# Patient Record
Sex: FEMALE | URBAN - METROPOLITAN AREA
[De-identification: names, ages, dates, MRNs, and addresses within clinical notes are randomized per-mention and may not be internally consistent; named-entity substitution may affect disease eponyms.]

---

## 2023-08-22 PROBLEM — Z00.00 ENCOUNTER FOR PREVENTIVE HEALTH EXAMINATION: Status: ACTIVE | Noted: 2023-08-22

## 2023-08-23 ENCOUNTER — EMERGENCY (EMERGENCY)
Facility: HOSPITAL | Age: 33
LOS: 1 days | Discharge: STILL ADMITTED OR ADMINISTRATIVE | End: 2023-08-23
Admitting: EMERGENCY MEDICINE
Payer: SELF-PAY

## 2023-08-23 ENCOUNTER — OUTPATIENT (OUTPATIENT)
Dept: OUTPATIENT SERVICES | Facility: HOSPITAL | Age: 33
LOS: 1 days | Discharge: HOME | End: 2023-08-23
Payer: SELF-PAY

## 2023-08-23 VITALS
DIASTOLIC BLOOD PRESSURE: 92 MMHG | RESPIRATION RATE: 18 BRPM | OXYGEN SATURATION: 100 % | HEART RATE: 80 BPM | TEMPERATURE: 98 F | SYSTOLIC BLOOD PRESSURE: 140 MMHG

## 2023-08-23 DIAGNOSIS — O26.899 OTHER SPECIFIED PREGNANCY RELATED CONDITIONS, UNSPECIFIED TRIMESTER: ICD-10-CM

## 2023-08-23 PROCEDURE — 83986 ASSAY PH BODY FLUID NOS: CPT | Mod: QW

## 2023-08-23 PROCEDURE — 99221 1ST HOSP IP/OBS SF/LOW 40: CPT | Mod: 25

## 2023-08-23 PROCEDURE — 59025 FETAL NON-STRESS TEST: CPT | Mod: 26

## 2023-08-23 PROCEDURE — L9996: CPT

## 2023-08-23 NOTE — ED ADULT TRIAGE NOTE - CHIEF COMPLAINT QUOTE
Pt reporting to the ED for lower abdominal pressure. Pt is 32 weeks pregnant, due date Oct 17. Pt is , no vaginal bleeding. Report given to L&D triage NP, to be brought to L&D.

## 2023-08-24 VITALS — DIASTOLIC BLOOD PRESSURE: 59 MMHG | HEART RATE: 75 BPM | SYSTOLIC BLOOD PRESSURE: 102 MMHG

## 2023-08-24 VITALS — HEART RATE: 90 BPM | SYSTOLIC BLOOD PRESSURE: 122 MMHG | DIASTOLIC BLOOD PRESSURE: 72 MMHG

## 2023-08-24 DIAGNOSIS — Z3A.32 32 WEEKS GESTATION OF PREGNANCY: ICD-10-CM

## 2023-08-24 DIAGNOSIS — R10.2 PELVIC AND PERINEAL PAIN: ICD-10-CM

## 2023-08-24 DIAGNOSIS — O26.893 OTHER SPECIFIED PREGNANCY RELATED CONDITIONS, THIRD TRIMESTER: ICD-10-CM

## 2023-08-24 DIAGNOSIS — Z03.71 ENCOUNTER FOR SUSPECTED PROBLEM WITH AMNIOTIC CAVITY AND MEMBRANE RULED OUT: ICD-10-CM

## 2023-08-24 LAB
APPEARANCE UR: CLEAR — SIGNIFICANT CHANGE UP
APPEARANCE UR: CLEAR — SIGNIFICANT CHANGE UP
BACTERIA # UR AUTO: NEGATIVE /HPF — SIGNIFICANT CHANGE UP
BACTERIA # UR AUTO: NEGATIVE /HPF — SIGNIFICANT CHANGE UP
BILIRUB UR-MCNC: NEGATIVE — SIGNIFICANT CHANGE UP
BILIRUB UR-MCNC: NEGATIVE — SIGNIFICANT CHANGE UP
CAST: 0 /LPF — SIGNIFICANT CHANGE UP (ref 0–4)
CAST: 0 /LPF — SIGNIFICANT CHANGE UP (ref 0–4)
COLOR SPEC: YELLOW — SIGNIFICANT CHANGE UP
COLOR SPEC: YELLOW — SIGNIFICANT CHANGE UP
DIFF PNL FLD: NEGATIVE — SIGNIFICANT CHANGE UP
DIFF PNL FLD: NEGATIVE — SIGNIFICANT CHANGE UP
GLUCOSE UR QL: NEGATIVE MG/DL — SIGNIFICANT CHANGE UP
GLUCOSE UR QL: NEGATIVE MG/DL — SIGNIFICANT CHANGE UP
KETONES UR-MCNC: NEGATIVE MG/DL — SIGNIFICANT CHANGE UP
KETONES UR-MCNC: NEGATIVE MG/DL — SIGNIFICANT CHANGE UP
LEUKOCYTE ESTERASE UR-ACNC: NEGATIVE — SIGNIFICANT CHANGE UP
LEUKOCYTE ESTERASE UR-ACNC: NEGATIVE — SIGNIFICANT CHANGE UP
NITRITE UR-MCNC: NEGATIVE — SIGNIFICANT CHANGE UP
NITRITE UR-MCNC: NEGATIVE — SIGNIFICANT CHANGE UP
PH UR: 5.5 — SIGNIFICANT CHANGE UP (ref 5–8)
PH UR: 5.5 — SIGNIFICANT CHANGE UP (ref 5–8)
PROT UR-MCNC: NEGATIVE MG/DL — SIGNIFICANT CHANGE UP
PROT UR-MCNC: NEGATIVE MG/DL — SIGNIFICANT CHANGE UP
RBC CASTS # UR COMP ASSIST: 1 /HPF — SIGNIFICANT CHANGE UP (ref 0–4)
RBC CASTS # UR COMP ASSIST: 1 /HPF — SIGNIFICANT CHANGE UP (ref 0–4)
SP GR SPEC: 1.02 — SIGNIFICANT CHANGE UP (ref 1–1.03)
SP GR SPEC: 1.02 — SIGNIFICANT CHANGE UP (ref 1–1.03)
SQUAMOUS # UR AUTO: 1 /HPF — SIGNIFICANT CHANGE UP (ref 0–5)
SQUAMOUS # UR AUTO: 1 /HPF — SIGNIFICANT CHANGE UP (ref 0–5)
UROBILINOGEN FLD QL: 0.2 MG/DL — SIGNIFICANT CHANGE UP (ref 0.2–1)
UROBILINOGEN FLD QL: 0.2 MG/DL — SIGNIFICANT CHANGE UP (ref 0.2–1)
WBC UR QL: 1 /HPF — SIGNIFICANT CHANGE UP (ref 0–5)
WBC UR QL: 1 /HPF — SIGNIFICANT CHANGE UP (ref 0–5)

## 2023-08-24 NOTE — OB RN TRIAGE NOTE - FALL HARM RISK - CONCLUSION
Ling comes in for evaluation of symptoms of dysuria and frequency which started yesterday. No fever, chills or back pain and no hematuria. She's had one other urinary tract infection in the past and states this feels the same.    I have reviewed the patient's medications and allergies, past medical, surgical, social and family history, updating these as appropriate. See Histories section of the EMR for a display of this information.     Dr. Carvajal supervising.    The remainder of the review of systems is negative except as outlined in the HPI.        EXAM: Patient is alert, oriented x 3, in no acute distress.  Skin color and turgor are normal.  Vital signs reviewed.    Vitals:    09/03/17 0956   BP: 142/75   Pulse: 76   Temp: 97.8 °F (36.6 °C)   SpO2: 99%   Weight: 56.7 kg   Height: 5' 7\" (1.702 m)           Lungs are clear auscultation bilaterally. Heart shows regular rate and rhythm without murmurs or rubs.    Abdomen is soft with mild suprapubic tenderness. No flank tenderness, masses or hepatosplenomegaly.    Back without CVA tenderness.    Urinalysis shows large blood with moderate LE T. Microscopic exam shows 6-10 red and white blood cells and few bacteria. Culture pending.    Assessment and plan: Urinary tract infection. We'll treat with Macrobid to be taken as directed. Push fluids and follow-up with her family physician if worsening.  
Pt here c/o dysuria and urinary freq onset yesterday  
Universal Safety Interventions

## 2023-08-24 NOTE — OB RN TRIAGE NOTE - CHIEF COMPLAINT QUOTE
Pelvic pain and fluid leakage [id number] , clear. Pelvic pain and fluid leakage [id number] , clear. Pelvic pain and fluid leakage [id number] , clear.

## 2023-08-24 NOTE — OB PROVIDER TRIAGE NOTE - NSHPLABSRESULTS_GEN_ALL_CORE
Urinalysis Basic - ( 24 Aug 2023 02:00 )    Color: Yellow / Appearance: Clear / S.017 / pH: x  Gluc: x / Ketone: Negative mg/dL  / Bili: Negative / Urobili: 0.2 mg/dL   Blood: x / Protein: Negative mg/dL / Nitrite: Negative   Leuk Esterase: Negative / RBC: 1 /HPF / WBC 1 /HPF   Sq Epi: x / Non Sq Epi: 1 /HPF / Bacteria: Negative /HPF

## 2023-08-24 NOTE — OB PROVIDER TRIAGE NOTE - PLAN OF CARE
- Please return for decreased/no fetal movement, vaginal bleeding similar to that of a period, leaking/gush of fluid, regular contractions occurring 4-5 minutes for one hour or requiring pain medication

## 2023-08-24 NOTE — OB PROVIDER TRIAGE NOTE - NSOBPROVIDERNOTE_OBGYN_ALL_OB_FT
- Use Colace as directed for constipation  - Use Preparation H for hemorrhoids 32y  @32.2w female ruled out for  labor and PROM, discharged to home.    - Patient to be discharged home with follow up and return precautions  - Clinic will be contacting you for appointment  - Please return for decreased/no fetal movement, vaginal bleeding similar to that of a period, leaking/gush of fluid, regular contractions occurring 4-5 minutes for one hour or requiring pain medication   - Patient and partner and educated of plan and demonstrate understanding. All questions answered. Discharge instructions provided and signed.   - Discharged at 0245  - Discussed with Dr. Donahue    - Use Colace 1 tab at night with full glass of water for constipation  - Use Preparation H for hemorrhoids

## 2023-08-24 NOTE — OB RN TRIAGE NOTE - FALL HARM RISK - UNIVERSAL INTERVENTIONS
Bed in lowest position, wheels locked, appropriate side rails in place/Call bell, personal items and telephone in reach/Instruct patient to call for assistance before getting out of bed or chair/Non-slip footwear when patient is out of bed/[place] to call system/Physically safe environment - no spills, clutter or unnecessary equipment/Purposeful Proactive Rounding/Room/bathroom lighting operational, light cord in reach Bed in lowest position, wheels locked, appropriate side rails in place/Call bell, personal items and telephone in reach/Instruct patient to call for assistance before getting out of bed or chair/Non-slip footwear when patient is out of bed/[place] to call system/Physically safe environment - no spills, clutter or unnecessary equipment/Purposeful Proactive Rounding/Room/bathroom lighting operational, light cord in reach Bed in lowest position, wheels locked, appropriate side rails in place/Call bell, personal items and telephone in reach/Instruct patient to call for assistance before getting out of bed or chair/Non-slip footwear when patient is out of bed/[place] to call system/Physically safe environment - no spills, clutter or unnecessary equipment/Purposeful Proactive Rounding/Room/bathroom lighting operational, light cord in reach

## 2023-08-24 NOTE — OB PROVIDER TRIAGE NOTE - NS_GESTAGE_OBGYN_ALL_OB_FT
01/2023 TTE: Poor quality, multiple areas of akinesis and hypokinesis with EF 35-40%. Note pitting edema and warmth in L ankle  -Cont. lasix 40mg daily for now  -Cont. Coreg BID  -Cont. Lisinopril daily  -BNP 32w2d

## 2023-08-24 NOTE — OB PROVIDER TRIAGE NOTE - HISTORY OF PRESENT ILLNESS
32y , EGA@ 32.2 weeks, presents for pelvic pain and leakage of fluid. Pt reports pelvic pain when sitting up from bed or sitting up from being on her side. Pt reports is not constant and is only on specific movements. Endorses 1 episode of damp underwear with clear fluid, pt thinks it may have been urine at 1300. Patient denies contractions, reports  + fetal movements, denies vaginal bleeding. Pt denies any other concerns.  Antepartum course is significant for:  - Pt received PNC in [place] up until she travelled to [place] 1 month ago

## 2023-08-24 NOTE — OB PROVIDER TRIAGE NOTE - NSHPPHYSICALEXAM_GEN_ALL_CORE
T(C): 37.1 (08-24-23 @ 00:50), Max: 37.1 (08-24-23 @ 00:50)  HR: 90 (08-24-23 @ 00:50) (80 - 90)  BP: 122/72 (08-24-23 @ 00:50) (122/72 - 140/92)  RR: 16 (08-24-23 @ 00:50) (16 - 18)  SpO2: 100% (08-23-23 @ 23:00) (100% - 100%)  – PE:   CV: RRR  Pulm: breathing comfortably on RA  Abd: soft, gravid, nontender  Extr: moving all extremities with ease  TAUS: images saved in ASOB, vertex position, anterior placenta, M mode:  FHR, BARB: 6, BPP 8/8  NST: baseline 140 FHR, moderate variability, + accels, - decels, reactive  SSE: cervix appears closed, no leakage of fluid, no VB, + leukorrhea

## 2023-08-24 NOTE — OB PROVIDER TRIAGE NOTE - ADDITIONAL INSTRUCTIONS
- Use Colace 1 tab at night with full glass of water for constipation  - Use Preparation H for hemorrhoids  - Please return for decreased/no fetal movement, vaginal bleeding similar to that of a period, leaking/gush of fluid, regular contractions occurring 4-5 minutes for one hour or requiring pain medication

## 2023-08-28 PROBLEM — Z00.00 ENCOUNTER FOR PREVENTIVE HEALTH EXAMINATION: Status: ACTIVE | Noted: 2023-08-28

## 2023-08-29 ENCOUNTER — OUTPATIENT (OUTPATIENT)
Dept: INPATIENT UNIT | Facility: HOSPITAL | Age: 33
LOS: 1 days | Discharge: HOME | End: 2023-08-29

## 2023-08-29 ENCOUNTER — EMERGENCY (EMERGENCY)
Facility: HOSPITAL | Age: 33
LOS: 1 days | Discharge: STILL ADMITTED OR ADMINISTRATIVE | End: 2023-08-29
Admitting: EMERGENCY MEDICINE
Payer: SELF-PAY

## 2023-08-29 ENCOUNTER — APPOINTMENT (OUTPATIENT)
Dept: ANTEPARTUM | Facility: CLINIC | Age: 33
End: 2023-08-29

## 2023-08-29 VITALS
DIASTOLIC BLOOD PRESSURE: 70 MMHG | SYSTOLIC BLOOD PRESSURE: 118 MMHG | TEMPERATURE: 98 F | HEART RATE: 88 BPM | RESPIRATION RATE: 16 BRPM

## 2023-08-29 VITALS
DIASTOLIC BLOOD PRESSURE: 69 MMHG | HEART RATE: 87 BPM | SYSTOLIC BLOOD PRESSURE: 113 MMHG | TEMPERATURE: 98 F | RESPIRATION RATE: 18 BRPM | OXYGEN SATURATION: 98 %

## 2023-08-29 VITALS — DIASTOLIC BLOOD PRESSURE: 55 MMHG | SYSTOLIC BLOOD PRESSURE: 116 MMHG | HEART RATE: 91 BPM

## 2023-08-29 DIAGNOSIS — O26.899 OTHER SPECIFIED PREGNANCY RELATED CONDITIONS, UNSPECIFIED TRIMESTER: ICD-10-CM

## 2023-08-29 LAB
APPEARANCE UR: CLEAR — SIGNIFICANT CHANGE UP
APPEARANCE UR: CLEAR — SIGNIFICANT CHANGE UP
BACTERIA # UR AUTO: ABNORMAL /HPF
BACTERIA # UR AUTO: ABNORMAL /HPF
BILIRUB UR-MCNC: NEGATIVE — SIGNIFICANT CHANGE UP
BILIRUB UR-MCNC: NEGATIVE — SIGNIFICANT CHANGE UP
CAST: 0 /LPF — SIGNIFICANT CHANGE UP (ref 0–4)
CAST: 0 /LPF — SIGNIFICANT CHANGE UP (ref 0–4)
COLOR SPEC: YELLOW — SIGNIFICANT CHANGE UP
COLOR SPEC: YELLOW — SIGNIFICANT CHANGE UP
DIFF PNL FLD: NEGATIVE — SIGNIFICANT CHANGE UP
DIFF PNL FLD: NEGATIVE — SIGNIFICANT CHANGE UP
GLUCOSE UR QL: NEGATIVE MG/DL — SIGNIFICANT CHANGE UP
GLUCOSE UR QL: NEGATIVE MG/DL — SIGNIFICANT CHANGE UP
KETONES UR-MCNC: NEGATIVE MG/DL — SIGNIFICANT CHANGE UP
KETONES UR-MCNC: NEGATIVE MG/DL — SIGNIFICANT CHANGE UP
LEUKOCYTE ESTERASE UR-ACNC: ABNORMAL
LEUKOCYTE ESTERASE UR-ACNC: ABNORMAL
NITRITE UR-MCNC: NEGATIVE — SIGNIFICANT CHANGE UP
NITRITE UR-MCNC: NEGATIVE — SIGNIFICANT CHANGE UP
PH UR: 6 — SIGNIFICANT CHANGE UP (ref 5–8)
PH UR: 6 — SIGNIFICANT CHANGE UP (ref 5–8)
PROT UR-MCNC: NEGATIVE MG/DL — SIGNIFICANT CHANGE UP
PROT UR-MCNC: NEGATIVE MG/DL — SIGNIFICANT CHANGE UP
RBC CASTS # UR COMP ASSIST: 1 /HPF — SIGNIFICANT CHANGE UP (ref 0–4)
RBC CASTS # UR COMP ASSIST: 1 /HPF — SIGNIFICANT CHANGE UP (ref 0–4)
SP GR SPEC: 1.01 — SIGNIFICANT CHANGE UP (ref 1–1.03)
SP GR SPEC: 1.01 — SIGNIFICANT CHANGE UP (ref 1–1.03)
SQUAMOUS # UR AUTO: 8 /HPF — HIGH (ref 0–5)
SQUAMOUS # UR AUTO: 8 /HPF — HIGH (ref 0–5)
UROBILINOGEN FLD QL: 0.2 MG/DL — SIGNIFICANT CHANGE UP (ref 0.2–1)
UROBILINOGEN FLD QL: 0.2 MG/DL — SIGNIFICANT CHANGE UP (ref 0.2–1)
WBC UR QL: 2 /HPF — SIGNIFICANT CHANGE UP (ref 0–5)
WBC UR QL: 2 /HPF — SIGNIFICANT CHANGE UP (ref 0–5)

## 2023-08-29 PROCEDURE — L9996: CPT

## 2023-08-29 NOTE — OB RN TRIAGE NOTE - FALL HARM RISK - UNIVERSAL INTERVENTIONS
Bed in lowest position, wheels locked, appropriate side rails in place/Call bell, personal items and telephone in reach/Instruct patient to call for assistance before getting out of bed or chair/Non-slip footwear when patient is out of bed/[place] to call system/Physically safe environment - no spills, clutter or unnecessary equipment/Purposeful Proactive Rounding/Room/bathroom lighting operational, light cord in reach Bed in lowest position, wheels locked, appropriate side rails in place/Call bell, personal items and telephone in reach/Instruct patient to call for assistance before getting out of bed or chair/Non-slip footwear when patient is out of bed/[place] to call system/Physically safe environment - no spills, clutter or unnecessary equipment/Purposeful Proactive Rounding/Room/bathroom lighting operational, light cord in reach Bed in lowest position, wheels locked, appropriate side rails in place/Call bell, personal items and telephone in reach/Instruct patient to call for assistance before getting out of bed or chair/Non-slip footwear when patient is out of bed/[place] to call system/Physically safe environment - no spills, clutter or unnecessary equipment/Purposeful Proactive Rounding/Room/bathroom lighting operational, light cord in reach

## 2023-08-29 NOTE — OB PROVIDER TRIAGE NOTE - NSPROVTRIAGESELHIDDEN_OBGYN_ALL_OB_FT
[NS_AttendInformed_OBGYN_ALL_OB:[id number]==] [NS_AttendInformed_OBGYN_ALL_OB:[id number]] [NS_AttendInformed_OBGYN_ALL_OB:[id number]==]

## 2023-08-29 NOTE — OB PROVIDER TRIAGE NOTE - HISTORY OF PRESENT ILLNESS
33yo  at 33+0 presenting from the ED for abdominal pain, came from [place] 1 month ago and has not yet had ob care in the [place] Pt. denies decreased fetal movement, visualization of blood, fluid, fever, cough, chills, sob, palpitations, n/v.  31yo  at 33+0 presenting from the ED for abdominal pain, came from [place] 1 month ago and has not yet had ob care in the [place] Pt. denies decreased fetal movement, visualization of blood, fluid, fever, cough, chills, sob, palpitations, n/v.

## 2023-08-29 NOTE — OB PROVIDER TRIAGE NOTE - NSOBPROVIDERNOTE_OBGYN_ALL_OB_FT
31yo  at 33+0 presenting from the ED for abdominal pain, came from [place] 1 month ago and has not yet had ob care in the [place]   Pt. placed on continuous external TOCO  NST- reactive, moderate variability, accelerations present  TAUS: vertex, cephalic Placenta: posterior  BPP:8/8 BARB:9.63  Physical: No tenderness on light or deep palpation, discomfort felt where baby's lower extremities are.  Pain 0/10 Contractions 0/10  Pt. denies any additional medical/surgical/psychiatric episodes or history.  Pt. denies decreased fetal movement, visualization of blood, fluid, fever, cough, chills, sob, palpitations, n/v.   Pt. declines lab work today as she is self pay at this time with insurance issues.  Pt. clinically stable to follow-up care with her primary visit at [facility] for which she endorses having an appointment with next week, 23.  Discussed with  and  31yo  at 33+0 presenting from the ED for abdominal pain, came from [place] 1 month ago and has not yet had ob care in the [place]   Pt. placed on continuous external TOCO  NST- reactive, moderate variability, accelerations present  TAUS: vertex, cephalic Placenta: posterior  BPP:8/8 BARB:9.63  Physical: No tenderness on light or deep palpation, discomfort felt where baby's lower extremities are.  Pain 0/10 Contractions 0/10  Pt. denies any additional medical/surgical/psychiatric episodes or history.  Pt. denies decreased fetal movement, visualization of blood, fluid, fever, cough, chills, sob, palpitations, n/v.   Pt. declines lab work today as she is self pay at this time with insurance issues.  Pt. clinically stable to follow-up care with her primary visit at [facility] for which she endorses having an appointment with next week, 23.  Discussed with  and  33yo  at 33+0 presenting from the ED for abdominal pain, came from [place] 1 month ago and has not yet had ob care in the [place]   Pt. placed on continuous external TOCO  NST- reactive, moderate variability, accelerations present  TAUS: vertex, cephalic Placenta: posterior  BPP:8/8 BARB:9.63  Physical: No tenderness on light or deep palpation, discomfort felt where baby's lower extremities are.  Pain 0/10 Contractions 0/10  Pt. denies any additional medical/surgical/psychiatric episodes or history.  Pt. denies decreased fetal movement, visualization of blood, fluid, fever, cough, chills, sob, palpitations, n/v.   Pt. declines lab work today as she is self pay at this time with insurance issues.  Pt. clinically stable to follow-up care with her primary visit at [facility] for which she endorses having an appointment with next week, 23.  Discussed with  and

## 2023-08-29 NOTE — OB RN TRIAGE NOTE - NS_OBGYNHISTORY_OBGYN_ALL_OB_FT
denies appointment for PNC week of 9/5. [facility]. last PN visit in [place] at 28 weeks appointment for PNC week of 9/5. [facility]. last PN visit in [place] at 28 weeks appointment for PNC week of 9/5. [facility]. last PN visit in [place] at 28 weeks

## 2023-08-29 NOTE — OB PROVIDER TRIAGE NOTE - ADDITIONAL INSTRUCTIONS
31yo  at 33+0 presenting from the ED for abdominal pain, came from [place] 1 month ago and has not yet had ob care in the [place]   Pt. declines lab work today as she is self pay at this time with insurance issues.  Pt. clinically stable to follow-up care with her primary visit at [facility] for which she endorses having an appointment with next week, 23.  Discussed with  and  33yo  at 33+0 presenting from the ED for abdominal pain, came from [place] 1 month ago and has not yet had ob care in the [place]   Pt. declines lab work today as she is self pay at this time with insurance issues.  Pt. clinically stable to follow-up care with her primary visit at [facility] for which she endorses having an appointment with next week, 23.  Discussed with  and  33yo  at 33+0 presenting from the ED for abdominal pain, came from [place] 1 month ago and has not yet had ob care in the [place]   Pt. declines lab work today as she is self pay at this time with insurance issues.  Pt. clinically stable to follow-up care with her primary visit at [facility] for which she endorses having an appointment with next week, 23.  Discussed with  and

## 2023-08-29 NOTE — OB PROVIDER TRIAGE NOTE - NS_OBGYNHISTORY_OBGYN_ALL_OB_FT
appointment for PNC week of 9/5. [facility]. last PN visit in [place] at 28 weeks appointment for PNC week of 9/5. [facility]. last PN visit in [place] at 28 weeks

## 2023-08-29 NOTE — ED ADULT NURSE NOTE - CHIEF COMPLAINT QUOTE
c/o lower abd pain pt reports 33 weeks pregnant, due date 10/17. denies any bleeding. pt to be seen in L&D

## 2023-08-31 DIAGNOSIS — R10.9 UNSPECIFIED ABDOMINAL PAIN: ICD-10-CM

## 2023-08-31 DIAGNOSIS — O26.893 OTHER SPECIFIED PREGNANCY RELATED CONDITIONS, THIRD TRIMESTER: ICD-10-CM

## 2023-08-31 DIAGNOSIS — Z3A.33 33 WEEKS GESTATION OF PREGNANCY: ICD-10-CM

## 2023-08-31 LAB
CULTURE RESULTS: SIGNIFICANT CHANGE UP
CULTURE RESULTS: SIGNIFICANT CHANGE UP
SPECIMEN SOURCE: SIGNIFICANT CHANGE UP
SPECIMEN SOURCE: SIGNIFICANT CHANGE UP

## 2023-09-01 ENCOUNTER — APPOINTMENT (OUTPATIENT)
Dept: MATERNAL FETAL MEDICINE | Facility: HOSPITAL | Age: 33
End: 2023-09-01

## 2024-01-16 RX ORDER — FERROUS GLUCONATE 100 %
1 POWDER (GRAM) MISCELLANEOUS
Refills: 0 | DISCHARGE